# Patient Record
Sex: MALE | Race: BLACK OR AFRICAN AMERICAN | Employment: OTHER | URBAN - METROPOLITAN AREA
[De-identification: names, ages, dates, MRNs, and addresses within clinical notes are randomized per-mention and may not be internally consistent; named-entity substitution may affect disease eponyms.]

---

## 2017-06-24 ENCOUNTER — HOSPITAL ENCOUNTER (EMERGENCY)
Age: 52
Discharge: HOME | End: 2017-06-24
Attending: EMERGENCY MEDICINE

## 2017-06-24 VITALS
BODY MASS INDEX: 38.46 KG/M2 | SYSTOLIC BLOOD PRESSURE: 163 MMHG | RESPIRATION RATE: 18 BRPM | WEIGHT: 299.7 LBS | DIASTOLIC BLOOD PRESSURE: 95 MMHG | TEMPERATURE: 98.4 F | HEIGHT: 74 IN | OXYGEN SATURATION: 97 % | HEART RATE: 90 BPM

## 2017-06-24 DIAGNOSIS — T56.0X1D: Primary | ICD-10-CM

## 2017-06-24 DIAGNOSIS — M10.179: Primary | ICD-10-CM

## 2017-06-24 RX ORDER — PREDNISONE 20 MG/1
20 TABLET ORAL DAILY
Qty: 10 TAB | Refills: 0 | Status: SHIPPED | OUTPATIENT
Start: 2017-06-24 | End: 2017-07-04

## 2017-06-24 RX ORDER — TRAMADOL HYDROCHLORIDE 50 MG/1
50 TABLET ORAL
COMMUNITY

## 2017-06-24 RX ORDER — LOSARTAN POTASSIUM 50 MG/1
TABLET ORAL DAILY
COMMUNITY

## 2017-06-24 RX ORDER — FUROSEMIDE 20 MG/1
10 TABLET ORAL DAILY
COMMUNITY

## 2017-06-24 RX ORDER — ALLOPURINOL 100 MG/1
TABLET ORAL DAILY
COMMUNITY

## 2017-06-24 RX ORDER — DILTIAZEM HYDROCHLORIDE EXTENDED-RELEASE TABLETS 360 MG/1
360 TABLET, EXTENDED RELEASE ORAL DAILY
COMMUNITY

## 2017-06-24 NOTE — UC PROVIDER NOTE
Patient is a 50 y.o. male presenting with ankle pain. The history is provided by the patient. Ankle Pain    This is a recurrent problem. The current episode started yesterday. The problem occurs constantly. The problem has not changed since onset. The pain is present in the left ankle. The quality of the pain is described as aching. The pain is at a severity of 7/10. Associated symptoms include stiffness. The symptoms are aggravated by movement. He has tried OTC pain medications for the symptoms. The treatment provided no relief. There has been no history of extremity trauma. Family history is significant for gout. Past Medical History:   Diagnosis Date    Atrial fibrillation ([facility] [address]     Gout     Hypertension         History reviewed. No pertinent surgical history. History reviewed. No pertinent family history. Social History     Social History    Marital status:      Spouse name: N/A    Number of children: N/A    Years of education: N/A     Occupational History    Not on file. Social History Main Topics    Smoking status: Former Smoker    Smokeless tobacco: Never Used    Alcohol use Not on file    Drug use: Not on file    Sexual activity: Not on file     Other Topics Concern    Not on file     Social History Narrative    No narrative on file                ALLERGIES: Review of patient's allergies indicates no known allergies. Review of Systems   Constitutional: Negative for activity change. HENT: Negative for congestion. Musculoskeletal: Positive for arthralgias, joint swelling and stiffness. Vitals:    06/24/17 0917   BP: (!) 163/95   Pulse: 90   Resp: 18   Temp: 98.4 °F (36.9 °C)   SpO2: 97%   Weight: 135.9 kg (299 lb 11.2 oz)   Height: 6' 2\" (1.88 m)       Physical Exam   Constitutional: He is oriented to person, place, and time. He appears well-developed and well-nourished.    Eyes: Conjunctivae and EOM are normal.   Pulmonary/Chest: Effort normal. Musculoskeletal: He exhibits tenderness. Left medial malleolus tender   Neurological: He is alert and oriented to person, place, and time. Skin: Skin is warm and dry. Psychiatric: He has a normal mood and affect. His behavior is normal. Judgment and thought content normal.   Nursing note and vitals reviewed. MDM     Differential Diagnosis; Clinical Impression; Plan:     CLINICAL IMPRESSION:  Lead-induced acute gout of ankle, unspecified laterality, subsequent encounter  (primary encounter diagnosis)    Plan:  1. prednisone  2.   3.   Risk of Significant Complications, Morbidity, and/or Mortality:   Presenting problems: Moderate  Diagnostic procedures: Moderate  Management options:   Moderate  Progress:   Patient progress:  Stable      Procedures

## 2017-06-24 NOTE — DISCHARGE INSTRUCTIONS

## 2025-02-22 ENCOUNTER — OFFICE VISIT (OUTPATIENT)
Dept: URGENT CARE | Age: 60
End: 2025-02-22
Payer: OTHER GOVERNMENT

## 2025-02-22 VITALS
TEMPERATURE: 97.9 F | DIASTOLIC BLOOD PRESSURE: 82 MMHG | RESPIRATION RATE: 16 BRPM | OXYGEN SATURATION: 96 % | HEART RATE: 70 BPM | SYSTOLIC BLOOD PRESSURE: 124 MMHG

## 2025-02-22 DIAGNOSIS — Z87.39 HISTORY OF GOUT: ICD-10-CM

## 2025-02-22 DIAGNOSIS — M25.561 ACUTE PAIN OF RIGHT KNEE: Primary | ICD-10-CM

## 2025-02-22 RX ORDER — METHYLPREDNISOLONE 4 MG/1
TABLET ORAL
Qty: 21 TABLET | Refills: 0 | Status: SHIPPED | OUTPATIENT
Start: 2025-02-22

## 2025-02-23 NOTE — PROGRESS NOTES
Subjective   Patient ID: Huey Wilkinson is a 59 y.o. male. They present today with a chief complaint of knee pain r/o gout (Hx gout).    History of Present Illness  Reports that he has a history of gout and it often shows up in his knee.  States symptoms are the same, no trauma.  No fever, chills, or difficulty walking        Past Medical History  Allergies as of 02/22/2025   • (No Known Allergies)       (Not in a hospital admission)       No past medical history on file.    No past surgical history on file.                                    Objective    Vitals:    02/22/25 1505   BP: 124/82   Pulse: 70   Resp: 16   Temp: 36.6 °C (97.9 °F)   SpO2: 96%     No LMP for male patient.    Physical Exam  Constitutional:       Appearance: Normal appearance.   Cardiovascular:      Rate and Rhythm: Normal rate and regular rhythm.      Pulses: Normal pulses.      Heart sounds: Normal heart sounds.   Pulmonary:      Effort: Pulmonary effort is normal.   Musculoskeletal:      Right knee: Erythema and crepitus present. Decreased range of motion. Tenderness present.      Instability Tests: Anterior drawer test negative. Medial Rayshawn test negative.      Right lower leg: Normal.   Skin:     General: Skin is warm and dry.      Capillary Refill: Capillary refill takes less than 2 seconds.   Neurological:      General: No focal deficit present.      Mental Status: He is alert and oriented to person, place, and time.   Psychiatric:         Mood and Affect: Mood normal.         Behavior: Behavior normal.         Thought Content: Thought content normal.         Judgment: Judgment normal.       Procedures    Point of Care Test & Imaging Results from this visit  No results found for this visit on 02/22/25.   No results found.    Diagnostic study results (if any) were reviewed by ABEL Tillman.    Assessment/Plan   Allergies, medications, history, and pertinent labs/EKGs/Imaging reviewed by ABEL Tillman.      Medical Decision Making  ***    Orders and Diagnoses  Diagnoses and all orders for this visit:  Acute pain of right knee  -     methylPREDNISolone (Medrol Dospak) 4 mg tablets; Take as directed on package.  History of gout  -     methylPREDNISolone (Medrol Dospak) 4 mg tablets; Take as directed on package.      Medical Admin Record      Patient disposition: Home    Electronically signed by ABEL Tillman  9:52 AM